# Patient Record
Sex: MALE | Race: OTHER | NOT HISPANIC OR LATINO
[De-identification: names, ages, dates, MRNs, and addresses within clinical notes are randomized per-mention and may not be internally consistent; named-entity substitution may affect disease eponyms.]

---

## 2019-12-26 PROBLEM — Z00.00 ENCOUNTER FOR PREVENTIVE HEALTH EXAMINATION: Status: ACTIVE | Noted: 2019-12-26

## 2024-05-08 ENCOUNTER — APPOINTMENT (OUTPATIENT)
Dept: PULMONOLOGY | Facility: CLINIC | Age: 54
End: 2024-05-08
Payer: COMMERCIAL

## 2024-05-08 VITALS
HEIGHT: 69 IN | WEIGHT: 210 LBS | OXYGEN SATURATION: 98 % | DIASTOLIC BLOOD PRESSURE: 80 MMHG | SYSTOLIC BLOOD PRESSURE: 124 MMHG | HEART RATE: 84 BPM | BODY MASS INDEX: 31.1 KG/M2

## 2024-05-08 DIAGNOSIS — G47.33 OBSTRUCTIVE SLEEP APNEA (ADULT) (PEDIATRIC): ICD-10-CM

## 2024-05-08 DIAGNOSIS — E66.9 OBESITY, UNSPECIFIED: ICD-10-CM

## 2024-05-08 DIAGNOSIS — G47.30 HYPERSOMNIA, UNSPECIFIED: ICD-10-CM

## 2024-05-08 DIAGNOSIS — G47.10 HYPERSOMNIA, UNSPECIFIED: ICD-10-CM

## 2024-05-08 PROCEDURE — 99203 OFFICE O/P NEW LOW 30 MIN: CPT

## 2024-05-08 NOTE — ASSESSMENT
[FreeTextEntry1] : Assessment: There is a high clinical suspicion for returning KIZZY despite the CPAP, the patient has classic signs of obstructive sleep apnea while on CPAP. Obesity EDS   Plan: I will order attended split night sleep testing and I will see the patient in F/U to arrange for therapies as needed. Weight loss was discussed with the patient. The patient was counseled against driving in a sleepy state.

## 2024-05-08 NOTE — PHYSICAL EXAM
[No Acute Distress] : no acute distress [Enlarged Base of the Tongue] : enlarged base of the tongue [IV] : Mallampati Class: IV [Normal Appearance] : normal appearance [No Neck Mass] : no neck mass [Normal Rate/Rhythm] : normal rate/rhythm [Normal S1, S2] : normal s1, s2 [No Murmurs] : no murmurs [No Resp Distress] : no resp distress [Clear to Auscultation Bilaterally] : clear to auscultation bilaterally [No Abnormalities] : no abnormalities [Benign] : benign [Normal Gait] : normal gait [No Clubbing] : no clubbing [No Cyanosis] : no cyanosis [No Edema] : no edema [FROM] : FROM [Normal Color/ Pigmentation] : normal color/ pigmentation [No Focal Deficits] : no focal deficits [Oriented x3] : oriented x3 [Normal Affect] : normal affect

## 2024-05-08 NOTE — REASON FOR VISIT
[Initial] : an initial visit [Sleep Apnea] : sleep apnea [TextBox_44] : Pt seen many years ago with KIZZY.  Having a lot of issues with returning fatigue despite the CPAP. He is using in nightly.

## 2024-06-25 ENCOUNTER — APPOINTMENT (OUTPATIENT)
Dept: SLEEP CENTER | Facility: HOSPITAL | Age: 54
End: 2024-06-25

## 2024-08-27 ENCOUNTER — APPOINTMENT (OUTPATIENT)
Dept: PULMONOLOGY | Facility: CLINIC | Age: 54
End: 2024-08-27
Payer: COMMERCIAL

## 2024-08-27 VITALS
HEIGHT: 69 IN | DIASTOLIC BLOOD PRESSURE: 88 MMHG | HEART RATE: 86 BPM | OXYGEN SATURATION: 96 % | SYSTOLIC BLOOD PRESSURE: 124 MMHG

## 2024-08-27 DIAGNOSIS — G47.30 HYPERSOMNIA, UNSPECIFIED: ICD-10-CM

## 2024-08-27 DIAGNOSIS — G47.33 OBSTRUCTIVE SLEEP APNEA (ADULT) (PEDIATRIC): ICD-10-CM

## 2024-08-27 DIAGNOSIS — E66.9 OBESITY, UNSPECIFIED: ICD-10-CM

## 2024-08-27 DIAGNOSIS — G47.10 HYPERSOMNIA, UNSPECIFIED: ICD-10-CM

## 2024-08-27 PROCEDURE — G2211 COMPLEX E/M VISIT ADD ON: CPT

## 2024-08-27 PROCEDURE — 99213 OFFICE O/P EST LOW 20 MIN: CPT

## 2024-08-27 NOTE — ASSESSMENT
[FreeTextEntry1] : Assessment: - Summary : Sleep apnea is suspected due to patient's reliance on a nasal mask and occasional panic attacks. Weight loss and subsequent gain may have altered the pressure requirements during treatment. - Problems : - Suspected Sleep Apnea  - Differential Diagnosis : - Sleep apnea, Weight-related health problems, Anxiety  Plan: - Summary : The plan involves obtaining the sleep study results to evaluate the severity of apnea and adjust the pressure settings as required. The patient has requested a new device, which will be pursued if the conditions are met. Additionally, an alteration in the mask type might be beneficial to prevent air leakage if the patient is still experiencing issues. - Plan : - Obtain sleep study results  - Adjust pressure settings of the machine  - Possibly change mask type

## 2024-08-27 NOTE — HISTORY OF PRESENT ILLNESS
[TextBox_4] : The patient reports his experiences with a sleep lab by a doctor from the [facility].  He recently underwent a split-night testing at that facility.  He is not getting the results back as of yet.  At this point we do not have the results as well.  He mentions that he has been using a nasal mask from [employer] as a home treatment for sleep apnea. He has experienced some panic attacks and believes that the pressure setting might be too low due to his previous weight loss and current weight gain. The patient requests a new machine if possible. - Chief Complaint (CC) : Panic attacks associated with suspected sleep apnea